# Patient Record
Sex: FEMALE | Race: WHITE | Employment: FULL TIME | URBAN - METROPOLITAN AREA
[De-identification: names, ages, dates, MRNs, and addresses within clinical notes are randomized per-mention and may not be internally consistent; named-entity substitution may affect disease eponyms.]

---

## 2018-11-08 LAB
AVERAGE GLUCOSE: 108
CHOLESTEROL, TOTAL: 195 MG/DL
CHOLESTEROL/HDL RATIO: 3.2
CREATININE: 1 MG/DL
HBA1C MFR BLD: 5.4 %
HDLC SERPL-MCNC: 61 MG/DL (ref 35–70)
LDL CHOLESTEROL CALCULATED: 105 MG/DL (ref 0–160)
POTASSIUM (K+): 4.3
TRIGL SERPL-MCNC: 145 MG/DL
VLDLC SERPL CALC-MCNC: NORMAL MG/DL

## 2018-12-14 ENCOUNTER — PREP FOR PROCEDURE (OUTPATIENT)
Dept: SURGERY | Age: 53
End: 2018-12-14

## 2018-12-14 RX ORDER — SODIUM CHLORIDE 0.9 % (FLUSH) 0.9 %
10 SYRINGE (ML) INJECTION PRN
Status: CANCELLED | OUTPATIENT
Start: 2018-12-14

## 2018-12-14 RX ORDER — SODIUM CHLORIDE 0.9 % (FLUSH) 0.9 %
10 SYRINGE (ML) INJECTION EVERY 12 HOURS SCHEDULED
Status: CANCELLED | OUTPATIENT
Start: 2018-12-14

## 2018-12-17 ENCOUNTER — ANESTHESIA EVENT (OUTPATIENT)
Dept: OPERATING ROOM | Age: 53
End: 2018-12-17
Payer: COMMERCIAL

## 2018-12-17 ENCOUNTER — ANESTHESIA (OUTPATIENT)
Dept: OPERATING ROOM | Age: 53
End: 2018-12-17
Payer: COMMERCIAL

## 2018-12-17 ENCOUNTER — HOSPITAL ENCOUNTER (OUTPATIENT)
Age: 53
Setting detail: OUTPATIENT SURGERY
Discharge: HOME | End: 2018-12-17
Attending: PLASTIC SURGERY | Admitting: PLASTIC SURGERY
Payer: COMMERCIAL

## 2018-12-17 VITALS
SYSTOLIC BLOOD PRESSURE: 144 MMHG | OXYGEN SATURATION: 99 % | DIASTOLIC BLOOD PRESSURE: 110 MMHG | RESPIRATION RATE: 1 BRPM

## 2018-12-17 VITALS
BODY MASS INDEX: 29.71 KG/M2 | SYSTOLIC BLOOD PRESSURE: 158 MMHG | OXYGEN SATURATION: 95 % | HEART RATE: 54 BPM | TEMPERATURE: 98 F | RESPIRATION RATE: 16 BRPM | HEIGHT: 64 IN | WEIGHT: 174 LBS | DIASTOLIC BLOOD PRESSURE: 70 MMHG

## 2018-12-17 DIAGNOSIS — M67.441 GANGLION CYST OF FINGER OF RIGHT HAND: Primary | ICD-10-CM

## 2018-12-17 LAB — HCG(URINE) PREGNANCY TEST: NEGATIVE

## 2018-12-17 PROCEDURE — 3600000012 HC SURGERY LEVEL 2 ADDTL 15MIN: Performed by: PLASTIC SURGERY

## 2018-12-17 PROCEDURE — 7100000000 HC PACU RECOVERY - FIRST 15 MIN: Performed by: PLASTIC SURGERY

## 2018-12-17 PROCEDURE — 81025 URINE PREGNANCY TEST: CPT

## 2018-12-17 PROCEDURE — 6360000002 HC RX W HCPCS: Performed by: NURSE ANESTHETIST, CERTIFIED REGISTERED

## 2018-12-17 PROCEDURE — 6360000002 HC RX W HCPCS: Performed by: PLASTIC SURGERY

## 2018-12-17 PROCEDURE — 2580000003 HC RX 258: Performed by: ANESTHESIOLOGY

## 2018-12-17 PROCEDURE — 7100000001 HC PACU RECOVERY - ADDTL 15 MIN: Performed by: PLASTIC SURGERY

## 2018-12-17 PROCEDURE — 88304 TISSUE EXAM BY PATHOLOGIST: CPT

## 2018-12-17 PROCEDURE — 3700000000 HC ANESTHESIA ATTENDED CARE: Performed by: PLASTIC SURGERY

## 2018-12-17 PROCEDURE — 6360000002 HC RX W HCPCS

## 2018-12-17 PROCEDURE — 7100000010 HC PHASE II RECOVERY - FIRST 15 MIN: Performed by: PLASTIC SURGERY

## 2018-12-17 PROCEDURE — 2709999900 HC NON-CHARGEABLE SUPPLY: Performed by: PLASTIC SURGERY

## 2018-12-17 PROCEDURE — 6370000000 HC RX 637 (ALT 250 FOR IP): Performed by: PLASTIC SURGERY

## 2018-12-17 PROCEDURE — 7100000011 HC PHASE II RECOVERY - ADDTL 15 MIN: Performed by: PLASTIC SURGERY

## 2018-12-17 PROCEDURE — 3700000001 HC ADD 15 MINUTES (ANESTHESIA): Performed by: PLASTIC SURGERY

## 2018-12-17 PROCEDURE — 3600000002 HC SURGERY LEVEL 2 BASE: Performed by: PLASTIC SURGERY

## 2018-12-17 RX ORDER — DEXAMETHASONE SODIUM PHOSPHATE 10 MG/ML
INJECTION, SOLUTION INTRAMUSCULAR; INTRAVENOUS PRN
Status: DISCONTINUED | OUTPATIENT
Start: 2018-12-17 | End: 2018-12-17 | Stop reason: SDUPTHER

## 2018-12-17 RX ORDER — ACETAMINOPHEN 325 MG/1
650 TABLET ORAL EVERY 4 HOURS PRN
Status: DISCONTINUED | OUTPATIENT
Start: 2018-12-17 | End: 2018-12-17 | Stop reason: HOSPADM

## 2018-12-17 RX ORDER — SODIUM CHLORIDE, SODIUM LACTATE, POTASSIUM CHLORIDE, CALCIUM CHLORIDE 600; 310; 30; 20 MG/100ML; MG/100ML; MG/100ML; MG/100ML
INJECTION, SOLUTION INTRAVENOUS CONTINUOUS
Status: DISCONTINUED | OUTPATIENT
Start: 2018-12-17 | End: 2018-12-17 | Stop reason: HOSPADM

## 2018-12-17 RX ORDER — OXYCODONE HYDROCHLORIDE AND ACETAMINOPHEN 5; 325 MG/1; MG/1
1 TABLET ORAL EVERY 4 HOURS PRN
Status: DISCONTINUED | OUTPATIENT
Start: 2018-12-17 | End: 2018-12-17 | Stop reason: HOSPADM

## 2018-12-17 RX ORDER — SODIUM CHLORIDE 0.9 % (FLUSH) 0.9 %
10 SYRINGE (ML) INJECTION PRN
Status: DISCONTINUED | OUTPATIENT
Start: 2018-12-17 | End: 2018-12-17 | Stop reason: HOSPADM

## 2018-12-17 RX ORDER — SODIUM CHLORIDE 0.9 % (FLUSH) 0.9 %
10 SYRINGE (ML) INJECTION EVERY 12 HOURS SCHEDULED
Status: DISCONTINUED | OUTPATIENT
Start: 2018-12-17 | End: 2018-12-17 | Stop reason: HOSPADM

## 2018-12-17 RX ORDER — DOCUSATE SODIUM 100 MG/1
100 CAPSULE, LIQUID FILLED ORAL 2 TIMES DAILY
Status: DISCONTINUED | OUTPATIENT
Start: 2018-12-17 | End: 2018-12-17 | Stop reason: HOSPADM

## 2018-12-17 RX ORDER — MORPHINE SULFATE 4 MG/ML
4 INJECTION, SOLUTION INTRAMUSCULAR; INTRAVENOUS
Status: DISCONTINUED | OUTPATIENT
Start: 2018-12-17 | End: 2018-12-17 | Stop reason: HOSPADM

## 2018-12-17 RX ORDER — OXYCODONE HYDROCHLORIDE AND ACETAMINOPHEN 5; 325 MG/1; MG/1
2 TABLET ORAL EVERY 4 HOURS PRN
Status: DISCONTINUED | OUTPATIENT
Start: 2018-12-17 | End: 2018-12-17 | Stop reason: HOSPADM

## 2018-12-17 RX ORDER — KETOROLAC TROMETHAMINE 30 MG/ML
30 INJECTION, SOLUTION INTRAMUSCULAR; INTRAVENOUS EVERY 6 HOURS
Status: DISCONTINUED | OUTPATIENT
Start: 2018-12-17 | End: 2018-12-17 | Stop reason: HOSPADM

## 2018-12-17 RX ORDER — ONDANSETRON 2 MG/ML
4 INJECTION INTRAMUSCULAR; INTRAVENOUS EVERY 6 HOURS PRN
Status: DISCONTINUED | OUTPATIENT
Start: 2018-12-17 | End: 2018-12-17 | Stop reason: HOSPADM

## 2018-12-17 RX ORDER — KETOROLAC TROMETHAMINE 30 MG/ML
INJECTION, SOLUTION INTRAMUSCULAR; INTRAVENOUS PRN
Status: DISCONTINUED | OUTPATIENT
Start: 2018-12-17 | End: 2018-12-17 | Stop reason: SDUPTHER

## 2018-12-17 RX ORDER — CEFAZOLIN SODIUM 2 G/50ML
2 SOLUTION INTRAVENOUS
Status: COMPLETED | OUTPATIENT
Start: 2018-12-17 | End: 2018-12-17

## 2018-12-17 RX ORDER — ACETAMINOPHEN AND CODEINE PHOSPHATE 300; 30 MG/1; MG/1
1-2 TABLET ORAL
Qty: 30 TABLET | Refills: 0 | Status: SHIPPED | OUTPATIENT
Start: 2018-12-17 | End: 2018-12-24

## 2018-12-17 RX ORDER — MORPHINE SULFATE 2 MG/ML
2 INJECTION, SOLUTION INTRAMUSCULAR; INTRAVENOUS
Status: DISCONTINUED | OUTPATIENT
Start: 2018-12-17 | End: 2018-12-17 | Stop reason: HOSPADM

## 2018-12-17 RX ORDER — ONDANSETRON 2 MG/ML
INJECTION INTRAMUSCULAR; INTRAVENOUS PRN
Status: DISCONTINUED | OUTPATIENT
Start: 2018-12-17 | End: 2018-12-17 | Stop reason: SDUPTHER

## 2018-12-17 RX ORDER — MIDAZOLAM HYDROCHLORIDE 1 MG/ML
INJECTION INTRAMUSCULAR; INTRAVENOUS PRN
Status: DISCONTINUED | OUTPATIENT
Start: 2018-12-17 | End: 2018-12-17 | Stop reason: SDUPTHER

## 2018-12-17 RX ORDER — KETOROLAC TROMETHAMINE 30 MG/ML
INJECTION, SOLUTION INTRAMUSCULAR; INTRAVENOUS
Status: COMPLETED
Start: 2018-12-17 | End: 2018-12-17

## 2018-12-17 RX ORDER — FENTANYL CITRATE 50 UG/ML
INJECTION, SOLUTION INTRAMUSCULAR; INTRAVENOUS PRN
Status: DISCONTINUED | OUTPATIENT
Start: 2018-12-17 | End: 2018-12-17 | Stop reason: SDUPTHER

## 2018-12-17 RX ADMIN — ONDANSETRON HYDROCHLORIDE 4 MG: 2 INJECTION, SOLUTION INTRAMUSCULAR; INTRAVENOUS at 08:31

## 2018-12-17 RX ADMIN — KETOROLAC TROMETHAMINE 30 MG: 30 INJECTION, SOLUTION INTRAMUSCULAR; INTRAVENOUS at 08:31

## 2018-12-17 RX ADMIN — MIDAZOLAM 2 MG: 1 INJECTION INTRAMUSCULAR; INTRAVENOUS at 07:45

## 2018-12-17 RX ADMIN — OXYCODONE HYDROCHLORIDE AND ACETAMINOPHEN 1 TABLET: 5; 325 TABLET ORAL at 10:45

## 2018-12-17 RX ADMIN — KETOROLAC TROMETHAMINE 30 MG: 30 INJECTION, SOLUTION INTRAMUSCULAR; INTRAVENOUS at 09:32

## 2018-12-17 RX ADMIN — SODIUM CHLORIDE, POTASSIUM CHLORIDE, SODIUM LACTATE AND CALCIUM CHLORIDE: 600; 310; 30; 20 INJECTION, SOLUTION INTRAVENOUS at 07:45

## 2018-12-17 RX ADMIN — SODIUM CHLORIDE, POTASSIUM CHLORIDE, SODIUM LACTATE AND CALCIUM CHLORIDE: 600; 310; 30; 20 INJECTION, SOLUTION INTRAVENOUS at 06:36

## 2018-12-17 RX ADMIN — CEFAZOLIN SODIUM 2 G: 2 SOLUTION INTRAVENOUS at 08:00

## 2018-12-17 RX ADMIN — FENTANYL CITRATE 100 MCG: 50 INJECTION, SOLUTION INTRAMUSCULAR; INTRAVENOUS at 07:45

## 2018-12-17 RX ADMIN — KETOROLAC TROMETHAMINE 30 MG: 30 INJECTION, SOLUTION INTRAMUSCULAR at 09:32

## 2018-12-17 RX ADMIN — DEXAMETHASONE SODIUM PHOSPHATE 10 MG: 10 INJECTION, SOLUTION INTRAMUSCULAR; INTRAVENOUS at 08:31

## 2018-12-17 ASSESSMENT — PULMONARY FUNCTION TESTS
PIF_VALUE: 2
PIF_VALUE: 4
PIF_VALUE: 2
PIF_VALUE: 1
PIF_VALUE: 3
PIF_VALUE: 2
PIF_VALUE: 3
PIF_VALUE: 1
PIF_VALUE: 3
PIF_VALUE: 3
PIF_VALUE: 1
PIF_VALUE: 1
PIF_VALUE: 3
PIF_VALUE: 1
PIF_VALUE: 0
PIF_VALUE: 0
PIF_VALUE: 3
PIF_VALUE: 0
PIF_VALUE: 2
PIF_VALUE: 2
PIF_VALUE: 0
PIF_VALUE: 2
PIF_VALUE: 3
PIF_VALUE: 1
PIF_VALUE: 2
PIF_VALUE: 3
PIF_VALUE: 2
PIF_VALUE: 1
PIF_VALUE: 2
PIF_VALUE: 3
PIF_VALUE: 4
PIF_VALUE: 2
PIF_VALUE: 2
PIF_VALUE: 1
PIF_VALUE: 3
PIF_VALUE: 3
PIF_VALUE: 2
PIF_VALUE: 3
PIF_VALUE: 2
PIF_VALUE: 3
PIF_VALUE: 3
PIF_VALUE: 0
PIF_VALUE: 4
PIF_VALUE: 2
PIF_VALUE: 3
PIF_VALUE: 3
PIF_VALUE: 2
PIF_VALUE: 3
PIF_VALUE: 3
PIF_VALUE: 1
PIF_VALUE: 2
PIF_VALUE: 0
PIF_VALUE: 0
PIF_VALUE: 2

## 2018-12-17 ASSESSMENT — PAIN DESCRIPTION - PAIN TYPE
TYPE: SURGICAL PAIN

## 2018-12-17 ASSESSMENT — PAIN DESCRIPTION - DESCRIPTORS
DESCRIPTORS: ACHING;BURNING
DESCRIPTORS: THROBBING
DESCRIPTORS: ACHING

## 2018-12-17 ASSESSMENT — PAIN DESCRIPTION - PROGRESSION: CLINICAL_PROGRESSION: GRADUALLY WORSENING

## 2018-12-17 ASSESSMENT — PAIN SCALES - GENERAL
PAINLEVEL_OUTOF10: 0
PAINLEVEL_OUTOF10: 4
PAINLEVEL_OUTOF10: 0
PAINLEVEL_OUTOF10: 0
PAINLEVEL_OUTOF10: 4
PAINLEVEL_OUTOF10: 0
PAINLEVEL_OUTOF10: 4
PAINLEVEL_OUTOF10: 5

## 2018-12-17 ASSESSMENT — PAIN DESCRIPTION - ORIENTATION
ORIENTATION: RIGHT

## 2018-12-17 ASSESSMENT — PAIN - FUNCTIONAL ASSESSMENT: PAIN_FUNCTIONAL_ASSESSMENT: 0-10

## 2018-12-17 ASSESSMENT — LIFESTYLE VARIABLES: SMOKING_STATUS: 0

## 2018-12-17 ASSESSMENT — PAIN DESCRIPTION - LOCATION
LOCATION: FINGER (COMMENT WHICH ONE)

## 2018-12-17 NOTE — PROGRESS NOTES
CLINICAL PHARMACY NOTE: MEDS TO [address] Select Patient?: No  Total # of Prescriptions Filled: 1   The following medications were delivered to the patient:  Acetaminophen/codeine #3  Total # of Interventions Completed: 3  Time Spent (min): 15    Additional Documentation:

## 2018-12-17 NOTE — ANESTHESIA PRE PROCEDURE
Endo/Other: Negative Endo/Other ROS                    Abdominal:         (-) obese     Vascular: negative vascular ROS. Anesthesia Plan      MAC     ASA 2       Induction: intravenous. MIPS: Postoperative opioids intended and Prophylactic antiemetics administered. Anesthetic plan and risks discussed with patient and spouse. Plan discussed with CRNA. DOS STAFF ADDENDUM:    Pt seen and examined, chart reviewed (including anesthesia, drug and allergy history). Anesthetic plan, risks, benefits, alternatives, and personnel involved discussed with patient. Patient verbalized an understanding and agrees to proceed. Plan discussed with care team members and agreed upon.     Kevin De Los Santos MD  Staff Anesthesiologist  6:49 AM      Kevin De Los Santos MD   12/17/2018

## 2018-12-18 NOTE — OP NOTE
excised. Hemostasis was secured. The site of communication into the joint was  scraped using a curette. The wound was thoroughly irrigated with  saline. The incision was closed with interrupted horizontal mattress  sutures of 5-0 nylon. Dressing consisted of Xeroform gauze, 2 x 2  gauze, and Sanjana. The patient tolerated the procedure well and was  transferred to the recovery room in a stable condition.         Humberto Bell MD    D: 12/17/2018 18:27:03       T: 12/18/2018 1:58:46     [email or web address]  Job#: [id number]     Doc#: [id number]  CC:

## 2020-05-28 VITALS
RESPIRATION RATE: 14 BRPM | WEIGHT: 174 LBS | BODY MASS INDEX: 29.71 KG/M2 | HEART RATE: 58 BPM | SYSTOLIC BLOOD PRESSURE: 120 MMHG | DIASTOLIC BLOOD PRESSURE: 80 MMHG | HEIGHT: 64 IN

## 2020-09-11 VITALS
TEMPERATURE: 96.2 F | RESPIRATION RATE: 14 BRPM | HEIGHT: 64 IN | DIASTOLIC BLOOD PRESSURE: 80 MMHG | WEIGHT: 189 LBS | SYSTOLIC BLOOD PRESSURE: 134 MMHG | HEART RATE: 62 BPM | BODY MASS INDEX: 32.27 KG/M2

## 2020-10-02 LAB
AVERAGE GLUCOSE: 108
CHOLESTEROL, TOTAL: 218 MG/DL
CHOLESTEROL/HDL RATIO: 4
CREATININE: 0.8 MG/DL
HBA1C MFR BLD: 5.4 %
HDLC SERPL-MCNC: 62 MG/DL (ref 35–70)
LDL CHOLESTEROL CALCULATED: 123 MG/DL (ref 0–160)
NONHDLC SERPL-MCNC: NORMAL MG/DL
POTASSIUM (K+): 4.4
TRIGL SERPL-MCNC: 165 MG/DL
VLDLC SERPL CALC-MCNC: NORMAL MG/DL

## 2021-03-02 ENCOUNTER — HOSPITAL ENCOUNTER (EMERGENCY)
Age: 56
Discharge: HOME | End: 2021-03-02
Payer: COMMERCIAL

## 2021-03-02 VITALS
OXYGEN SATURATION: 95 % | DIASTOLIC BLOOD PRESSURE: 83 MMHG | HEIGHT: 64 IN | TEMPERATURE: 97.8 F | WEIGHT: 175 LBS | RESPIRATION RATE: 18 BRPM | SYSTOLIC BLOOD PRESSURE: 135 MMHG | HEART RATE: 57 BPM | BODY MASS INDEX: 29.88 KG/M2

## 2021-03-02 DIAGNOSIS — L03.011 PARONYCHIA OF RIGHT INDEX FINGER: Primary | ICD-10-CM

## 2021-03-02 PROCEDURE — 99211 OFF/OP EST MAY X REQ PHY/QHP: CPT

## 2021-03-02 RX ORDER — SULFAMETHOXAZOLE AND TRIMETHOPRIM 800; 160 MG/1; MG/1
1 TABLET ORAL 2 TIMES DAILY
Qty: 10 TABLET | Refills: 0 | Status: SHIPPED | OUTPATIENT
Start: 2021-03-02 | End: 2021-03-07

## 2021-03-02 ASSESSMENT — PAIN DESCRIPTION - LOCATION: LOCATION: FINGER (COMMENT WHICH ONE)

## 2021-03-02 ASSESSMENT — PAIN DESCRIPTION - ORIENTATION: ORIENTATION: RIGHT

## 2021-03-02 ASSESSMENT — PAIN DESCRIPTION - PAIN TYPE: TYPE: ACUTE PAIN

## 2021-03-02 NOTE — ED PROVIDER NOTES
[address] [facility]  Department of Emergency Medicine   ED  Encounter Note  Admit Date/RoomTime: 3/2/2021  4:55 PM  ED Room:     NAME: Khalif Kendrick  : 1965  MRN: [id number]     Chief Complaint:  Paronychia (Pt c/o redness and throbbing pain around R index finger for 3 days)    History of Present Illness        Khalif Kendrick is a 48 y.o. old female presenting to the emergency department with an infection to her right finger and fingernail. Patient states for 3 days now she has had a throbbing pain to the tip of her right index finger. She is also red. She does admit to some picking around the nail and what she thought was a little cut in the area. She does admit she had some bleeding along the side of her nail. She is tried no over-the-counter medications for this complaint. Patient is right-handed. ROS   Pertinent positives and negatives are stated within HPI, all other systems reviewed and are negative. Past Medical History:  has a past medical history of Celiac disease and Hyperlipidemia. Surgical History:  has a past surgical history that includes  section; Endoscopy, colon, diagnostic; EXCISION LESION HAND / FINGER (Right, 2018); and Tubal ligation. Social History:  reports that she quit smoking about 18 years ago. Her smoking use included cigarettes. She has a 18.00 pack-year smoking history. She has never used smokeless tobacco. She reports current alcohol use of about 1.0 standard drinks of alcohol per week. She reports that she does not use drugs. Family History: family history includes Coronary Art Dis in her brother; Heart Attack (age of onset: 68) in her father. Allergies: Patient has no known allergies.     Physical Exam   Oxygen Saturation Interpretation: Normal.        ED Triage Vitals [21 1655]   BP Temp Temp Source Pulse Resp SpO2 Height Weight   135/83 97.8 °F (36.6 °C) Infrared 57 18 95 % 5' 4\" (1.626 m) 175 lb (79.4 kg) General:  NAD. Alert and Oriented. Well-appearing. Skin:  Warm, dry. No rashes. Head:  Normocephalic. Atraumatic. Eyes:  EOMI. Conjunctiva normal.  ENT:  Oral mucosa moist.  Airway patent. Neck:  Supple. Normal ROM. Respiratory:  No respiratory distress. No labored breathing. Lungs clear without rales, rhonchi or wheezing. Cardiovascular:  Regular rate. No Murmur. No peripheral edema. Extremities warm and good color. Extremities: Redness and swelling around the nail to the right index finger. There is no pus pocket. There is some dried bloody crusting to the lateral nail margin. 2+ right radial pulse. Back:  Normal ROM. Nontender to palpation. Neuro:  Alert and Oriented to person, place, time and situation. Normal LOC. Moves all extremities. Speech fluent. Psych:  Calm and Cooperative. Normal thought process. Normal judgement. Lab / Imaging Results   (All laboratory and radiology results have been personally reviewed by myself)  Labs:  No results found for this visit on 03/02/21. Imaging: All Radiology results interpreted by Radiologist unless otherwise noted. No orders to display       ED Course / Medical Decision Making   Medications - No data to display     Consult(s):   None    Procedure(s):   none    MDM:      Imaging was not obtained based on no suspicion for fracture / bony abnormality as per history/physical findings. Plan is subsequently for symptom control, limited use and  immobilization with appropriate outpatient follow-up. Plan of Care/Counseling:  I reviewed today's visit with the patient in addition to providing specific details for the plan of care and counseling regarding the diagnosis and prognosis. Questions are answered at this time and are agreeable with the plan. Assessment      1. Paronychia of right index finger New Problem     Plan   Discharge home.   Patient condition is good    New Medications     New Prescriptions

## 2021-09-24 ENCOUNTER — TELEPHONE (OUTPATIENT)
Dept: FAMILY MEDICINE CLINIC | Age: 56
End: 2021-09-24

## 2021-09-29 ENCOUNTER — OFFICE VISIT (OUTPATIENT)
Dept: FAMILY MEDICINE CLINIC | Age: 56
End: 2021-09-29
Payer: COMMERCIAL

## 2021-09-29 VITALS
BODY MASS INDEX: 30.04 KG/M2 | DIASTOLIC BLOOD PRESSURE: 86 MMHG | WEIGHT: 175 LBS | TEMPERATURE: 97.5 F | OXYGEN SATURATION: 96 % | HEART RATE: 52 BPM | SYSTOLIC BLOOD PRESSURE: 132 MMHG

## 2021-09-29 DIAGNOSIS — E78.5 HYPERLIPIDEMIA, UNSPECIFIED HYPERLIPIDEMIA TYPE: ICD-10-CM

## 2021-09-29 DIAGNOSIS — K90.0 CELIAC DISEASE: ICD-10-CM

## 2021-09-29 DIAGNOSIS — R01.1 SYSTOLIC MURMUR: ICD-10-CM

## 2021-09-29 DIAGNOSIS — F32.A DEPRESSION, UNSPECIFIED DEPRESSION TYPE: ICD-10-CM

## 2021-09-29 DIAGNOSIS — K21.9 GASTROESOPHAGEAL REFLUX DISEASE WITHOUT ESOPHAGITIS: ICD-10-CM

## 2021-09-29 DIAGNOSIS — R06.83 SNORING: ICD-10-CM

## 2021-09-29 DIAGNOSIS — J34.89 PAIN OF MAXILLARY SINUS: Primary | ICD-10-CM

## 2021-09-29 PROCEDURE — 99214 OFFICE O/P EST MOD 30 MIN: CPT | Performed by: INTERNAL MEDICINE

## 2021-09-29 PROCEDURE — G8417 CALC BMI ABV UP PARAM F/U: HCPCS | Performed by: INTERNAL MEDICINE

## 2021-09-29 PROCEDURE — 3017F COLORECTAL CA SCREEN DOC REV: CPT | Performed by: INTERNAL MEDICINE

## 2021-09-29 PROCEDURE — G8427 DOCREV CUR MEDS BY ELIG CLIN: HCPCS | Performed by: INTERNAL MEDICINE

## 2021-09-29 PROCEDURE — 1036F TOBACCO NON-USER: CPT | Performed by: INTERNAL MEDICINE

## 2021-09-29 SDOH — ECONOMIC STABILITY: FOOD INSECURITY: WITHIN THE PAST 12 MONTHS, THE FOOD YOU BOUGHT JUST DIDN'T LAST AND YOU DIDN'T HAVE MONEY TO GET MORE.: NEVER TRUE

## 2021-09-29 SDOH — ECONOMIC STABILITY: FOOD INSECURITY: WITHIN THE PAST 12 MONTHS, YOU WORRIED THAT YOUR FOOD WOULD RUN OUT BEFORE YOU GOT MONEY TO BUY MORE.: NEVER TRUE

## 2021-09-29 ASSESSMENT — PATIENT HEALTH QUESTIONNAIRE - PHQ9
1. LITTLE INTEREST OR PLEASURE IN DOING THINGS: 0
SUM OF ALL RESPONSES TO PHQ QUESTIONS 1-9: 0
SUM OF ALL RESPONSES TO PHQ QUESTIONS 1-9: 0
2. FEELING DOWN, DEPRESSED OR HOPELESS: 0
SUM OF ALL RESPONSES TO PHQ9 QUESTIONS 1 & 2: 0
SUM OF ALL RESPONSES TO PHQ QUESTIONS 1-9: 0

## 2021-09-29 ASSESSMENT — SOCIAL DETERMINANTS OF HEALTH (SDOH): HOW HARD IS IT FOR YOU TO PAY FOR THE VERY BASICS LIKE FOOD, HOUSING, MEDICAL CARE, AND HEATING?: NOT HARD AT ALL

## 2021-09-29 NOTE — PROGRESS NOTES
Subjective:     Chief Complaint   Patient presents with    Facial Swelling   Patient complains of swelling on the right maxillary area for the past 1 year, which is progressively getting worse, she has noticed in her pictures and when she smiles her right side of the nose is elevated than the left side, she has noticed prominence right middle area, she has been snoring more than before,  Denies any difficulty breathing,  Denies any runny nose or stuffy nose,    She was seen by Dr. Zoltan Haney in 2020 for some throat blood work which was okay    Has history of depression seen by Dr. Collin Cardoza who started her on Celexa during menopause    She has celiac disease doing better with gluten-free diet she follows with Dr. Calista Lynn    History of systolic murmur seen by cardiologist Dr. Bronwyn Monique work-up negative    Past Medical History:   Diagnosis Date    Celiac disease     Hyperlipidemia         Social History     Socioeconomic History    Marital status:      Spouse name: Not on file    Number of children: Not on file    Years of education: Not on file    Highest education level: Not on file   Occupational History    Not on file   Tobacco Use    Smoking status: Former Smoker     Packs/day: 1.00     Years: 18.00     Pack years: 18.00     Types: Cigarettes     Quit date: 2003     Years since quittin.5    Smokeless tobacco: Never Used   Vaping Use    Vaping Use: Never used   Substance and Sexual Activity    Alcohol use:  Yes     Alcohol/week: 1.0 standard drinks     Types: 1 Shots of liquor per week     Comment: rarely    Drug use: No    Sexual activity: Not on file   Other Topics Concern    Not on file   Social History Narrative    Not on file     Social Determinants of Health     Financial Resource Strain: Low Risk     Difficulty of Paying Living Expenses: Not hard at all   Food Insecurity: No Food Insecurity    Worried About [address] in the Last Year: Never true   [employer] of [employer] in the Last Year: Never true   Transportation Needs:     Lack of Transportation (Medical):  Lack of Transportation (Non-Medical):    Physical Activity:     Days of Exercise per Week:     Minutes of Exercise per Session:    Stress:     Feeling of Stress :    Social Connections:     Frequency of Communication with Friends and Family:     Frequency of Social Gatherings with Friends and Family:     Attends Caodaism Services:     Active Member of Clubs or Organizations:     Attends Club or Organization Meetings:     Marital Status:    Intimate Partner Violence:     Fear of Current or Ex-Partner:     Emotionally Abused:     Physically Abused:     Sexually Abused:         Past Surgical History:   Procedure Laterality Date     SECTION      twice    ENDOSCOPY, COLON, DIAGNOSTIC      EXCISION LESION HAND / FINGER Right 2018    EXCISION GANGLION CYST RIGHT INDEX FINGER performed by Eusebio Sylvester [facility] at [facility]          Family History   Problem Relation Age of Onset    Heart Attack Father 68    Coronary Art Dis Brother         recently  of CAD         No Known Allergies     ROS  No acute distress  Cardiac: Denies any chest pain or palpitation  Physically very active denies any angina or dyspnea  History of soft systolic murmur seen by Dr. Gilbert Jones had echo work-up negative in 2018  Respiratory: Denies any cough or shortness of breath  GI: No abdominal pain.  Denies any nausea vomiting or diarrhea  History of celiac disease stable on gluten-free diet  Colonoscopy 2020 with Dr. Simon Yao she will get me a copy she follows with him twice a year  : Denies any dysuria frequency or hematuria  Neuro: No headache or dizziness  Endocrine: No diabetes  Skin: normal  No recent weight gain or weight loss  Denies any change in vision    Her snoring is worse than before  Feels a fullness and prominence on the right maxillary area  Is a smiling the right side of the nose goes up    Objective:    /86   Pulse 52   Temp 97.5 °F (36.4 °C)   Wt 175 lb (79.4 kg)   LMP 07/10/2018   SpO2 96%   BMI 30.04 kg/m²     Constitutional: Alert awake and oriented    There is prominence of the right maxillary area  There is swelling of the right maxillary area  No maxillary tenderness    Eyes: Pupils equal bilaterally. Extraocular muscles intact  Neck: no JVD adenopathy no bruit  Neck no adenopathy  Heart:  RRR, soft 1/6 systolic murmur, no gallops, or rubs. Lungs:    no wheeze, rales or rhonchi  Abd: bowel sounds present, nontender, nondistended, no masses  Extrem:  No clubbing, cyanosis, or edema  Neuro: AAOx3,No Focal deficit  Psychological: no depression or anxiety   Asymmetry of the face    Current Outpatient Medications   Medication Sig Dispense Refill    Omeprazole (PRILOSEC PO) Take by mouth daily      citalopram (CELEXA) 20 MG tablet Take 20 mg by mouth daily       No current facility-administered medications for this visit. Last 3 BMP  Lab Results   Component Value Date/Time    K 4.4 10/02/2020 12:00 AM    K 4.3 11/08/2018 12:00 AM    CREATININE 0.8 10/02/2020 12:00 AM    CREATININE 1.0 11/08/2018 12:00 AM       Last 3 CMP:    Lab Results   Component Value Date/Time    K 4.4 10/02/2020 12:00 AM    K 4.3 11/08/2018 12:00 AM    CREATININE 0.8 10/02/2020 12:00 AM    CREATININE 1.0 11/08/2018 12:00 AM        CBC:   No results found for: WBC, RBC, HGB, HCT, MCV, MCH, MCHC, RDW, PLT, MPV    A1C:  Lab Results   Component Value Date/Time    LABA1C 5.4 10/02/2020 12:00 AM       Lipid panel:  Lab Results   Component Value Date    CHOL 218 10/02/2020    CHOL 195 11/08/2018    TRIG 165 10/02/2020    TRIG 145 11/08/2018    HDL 62 10/02/2020    HDL 61 11/08/2018        No results found for: PROT, INR    No results found for: MG      Assessment. Napoleon Player was seen today for facial swelling.     Diagnoses and all orders for this visit:    Pain of maxillary sinus  -     CT SINUS WO CONTRAST; Future  -     External Referral To ENT    Snoring  -     CT SINUS WO CONTRAST; Future  -     External Referral To ENT    Gastroesophageal reflux disease without esophagitis    Depression, unspecified depression type    Celiac disease    Hyperlipidemia, unspecified hyperlipidemia type    Systolic murmur       Patient Active Problem List   Diagnosis    Celiac disease    Depression    Gastroesophageal reflux disease without esophagitis    Systolic murmur       Plan: Swelling right side of the face, prominence of the right maxillary area, CT sinus cyst rule out any tumor  Refer to ENT Dr. Red Guardado reflux disease stable on the Prilosec    Celiac disease doing better with the gluten-free diet    Hyperlipidemia check CMP and lipid profile    Asymptomatic soft systolic murmur evaluated by cardiologist 2018 echo was normal    Postmenopausal she follows with Dr. Shreya French    Depression controlled with Celexa 20 mg daily  Follow back in 4 to 6 weeks    Return in about 2 months (around 11/29/2021).        Hiram Sena MD  12:37 PM  9/29/2021     DE

## 2022-01-06 ENCOUNTER — TELEPHONE (OUTPATIENT)
Dept: FAMILY MEDICINE CLINIC | Age: 57
End: 2022-01-06

## 2022-01-06 NOTE — TELEPHONE ENCOUNTER
----- Message from Dorothey Net sent at 2022  3:26 PM EST -----  Subject: Appointment Request    Reason for Call: New Patient Request Appointment    QUESTIONS  Type of Appointment? New Patient/New to Provider  Reason for appointment request? No appointments available during search  Additional Information for Provider? Pt is needing a new provider. She is   a ex patient of Dr. Dakota Vargas. She is really interested in seeing   Elizabeth Hutchinson. I don't believe she is taking new patients. Her   daughter Cozette Cogan is a patient. Please advise.  ---------------------------------------------------------------------------  --------------  CALL BACK INFO  What is the best way for the office to contact you? OK to leave message on   voicemail  Preferred Call Back Phone Number? [phone]  ---------------------------------------------------------------------------  --------------  SCRIPT ANSWERS  Relationship to Patient? Self  Specialty Confirmation? Primary Care  Is this the first appointment to establish care for a ? No  Have you been diagnosed with, awaiting test results for, or told that you   are suspected of having COVID-19 (Coronavirus)? (If patient has tested   negative or was tested as a requirement for work, school, or travel and   not based on symptoms, answer no)? No  Within the past two weeks have you developed any of the following symptoms   (answer no if symptoms have been present longer than 2 weeks or began   more than 2 weeks ago)? Fever or Chills, Cough, Shortness of breath or   difficulty breathing, Loss of taste or smell, Sore throat, Nasal   congestion, Sneezing or runny nose, Fatigue or generalized body aches   (answer no if pain is specific to a body part e.g. back pain), Diarrhea,   Headache? No  Have you had close contact with someone with COVID-19 in the last 14 days? No  (Service Expert - click yes below to proceed with [employer] As Usual   Scheduling)?  Yes

## 2022-01-31 ENCOUNTER — OFFICE VISIT (OUTPATIENT)
Dept: FAMILY MEDICINE CLINIC | Age: 57
End: 2022-01-31
Payer: COMMERCIAL

## 2022-01-31 VITALS
BODY MASS INDEX: 30.56 KG/M2 | HEART RATE: 65 BPM | WEIGHT: 179 LBS | SYSTOLIC BLOOD PRESSURE: 132 MMHG | OXYGEN SATURATION: 98 % | RESPIRATION RATE: 20 BRPM | HEIGHT: 64 IN | DIASTOLIC BLOOD PRESSURE: 84 MMHG

## 2022-01-31 DIAGNOSIS — F32.9 REACTIVE DEPRESSION: Primary | ICD-10-CM

## 2022-01-31 DIAGNOSIS — Z13.220 SCREENING CHOLESTEROL LEVEL: ICD-10-CM

## 2022-01-31 DIAGNOSIS — K21.9 GASTROESOPHAGEAL REFLUX DISEASE WITHOUT ESOPHAGITIS: ICD-10-CM

## 2022-01-31 PROCEDURE — G8427 DOCREV CUR MEDS BY ELIG CLIN: HCPCS | Performed by: FAMILY MEDICINE

## 2022-01-31 PROCEDURE — G8417 CALC BMI ABV UP PARAM F/U: HCPCS | Performed by: FAMILY MEDICINE

## 2022-01-31 PROCEDURE — G8484 FLU IMMUNIZE NO ADMIN: HCPCS | Performed by: FAMILY MEDICINE

## 2022-01-31 PROCEDURE — 99214 OFFICE O/P EST MOD 30 MIN: CPT | Performed by: FAMILY MEDICINE

## 2022-01-31 PROCEDURE — 1036F TOBACCO NON-USER: CPT | Performed by: FAMILY MEDICINE

## 2022-01-31 PROCEDURE — 3017F COLORECTAL CA SCREEN DOC REV: CPT | Performed by: FAMILY MEDICINE

## 2022-01-31 ASSESSMENT — ENCOUNTER SYMPTOMS
NAUSEA: 0
DIARRHEA: 0
VOMITING: 0
SHORTNESS OF BREATH: 0

## 2022-01-31 NOTE — PROGRESS NOTES
Patient is a new patient here to get established. Previous doctor: Dr. Oly Bowers Kellie(:  1965) is a 55 y.o. female, here for     Patient has had bilateral hand pain and stiffness in mornings. Patient denies need for pain medication. Uses exercise ball in mornings. Denies swelling. Started this winter. Right middle finger is worse due to old injury. Patient has history of GERD. Sees Dr. Talha Trujillo and taking omeprazole. Also has history of Celiac disease. Patient had history of PMDD. Has been taking Celexa daily for years. Wants to know how to safely wean off. Review of Systems   Eyes: Negative for visual disturbance. Respiratory: Negative for shortness of breath. Cardiovascular: Negative for chest pain, palpitations and leg swelling. Gastrointestinal: Negative for diarrhea, nausea and vomiting. Genitourinary: Negative for difficulty urinating, dysuria and frequency. Skin: Negative for rash. Psychiatric/Behavioral: Negative for dysphoric mood. Prior to Visit Medications    Medication Sig Taking?  Authorizing Provider   Omeprazole (PRILOSEC PO) Take by mouth daily Yes Historical Provider, MD   citalopram (CELEXA) 20 MG tablet Take 20 mg by mouth daily Yes Historical Provider, MD        No Known Allergies    Past Medical History:   Diagnosis Date    Celiac disease     GERD (gastroesophageal reflux disease)        Past Surgical History:   Procedure Laterality Date     SECTION      twice    ENDOSCOPY, COLON, DIAGNOSTIC      EXCISION LESION HAND / FINGER Right 2018    EXCISION GANGLION CYST RIGHT INDEX FINGER performed by Eduardo Corado [facility] at [facility] History     Socioeconomic History    Marital status:      Spouse name: Not on file    Number of children: Not on file    Years of education: Not on file    Highest education level: Not on file   Occupational History    Not on file   Tobacco Use    Smoking status: Former Smoker     Packs/day: 1.00     Years: 18.00     Pack years: 18.00     Types: Cigarettes     Quit date: 2003     Years since quittin.9    Smokeless tobacco: Never Used   Vaping Use    Vaping Use: Never used   Substance and Sexual Activity    Alcohol use: Yes     Alcohol/week: 1.0 standard drink     Types: 1 Shots of liquor per week     Comment: occasionally    Drug use: No    Sexual activity: Not on file   Other Topics Concern    Not on file   Social History Narrative    Not on file     Social Determinants of Health     Financial Resource Strain: Low Risk     Difficulty of Paying Living Expenses: Not hard at all   Food Insecurity: No Food Insecurity    Worried About [address] in the Last Year: Never true    [facility] [employer] in the Last Year: Never true   Transportation Needs:     Lack of Transportation (Medical): Not on file    Lack of Transportation (Non-Medical):  Not on file   Physical Activity:     Days of Exercise per Week: Not on file    Minutes of Exercise per Session: Not on file   Stress:     Feeling of Stress : Not on file   Social Connections:     Frequency of Communication with Friends and Family: Not on file    Frequency of Social Gatherings with Friends and Family: Not on file    Attends Hoahaoism Services: Not on file    Active Member of [address] [place] [address] or Organizations: Not on file    Attends Club or Organization Meetings: Not on file    Marital Status: Not on file   Intimate Partner Violence:     Fear of Current or Ex-Partner: Not on file    Emotionally Abused: Not on file    Physically Abused: Not on file    Sexually Abused: Not on file   Housing Stability:     Unable to Pay for Housing in the Last Year: Not on file    Number of [place] in the Last Year: Not on file    Unstable Housing in the Last Year: Not on file        Family History   Problem Relation Age of Onset    High Blood Pressure Mother     Heart Attack Father 68    Heart Disease Brother         recently  of CAD     Cancer Maternal Grandmother         stomach    Lung Cancer Maternal Grandfather     Heart Disease Paternal Grandfather        Vitals:    22 1459   BP: 132/84   Pulse: 65   Resp: 20   SpO2: 98%   Weight: 179 lb (81.2 kg)   Height: 5' 4\" (1.626 m)     Estimated body mass index is 30.73 kg/m² as calculated from the following:    Height as of this encounter: 5' 4\" (1.626 m). Weight as of this encounter: 179 lb (81.2 kg). Physical Exam  Vitals reviewed. Constitutional:       Appearance: She is well-developed. Cardiovascular:      Rate and Rhythm: Normal rate and regular rhythm. Heart sounds: Normal heart sounds. No murmur heard. No friction rub. Pulmonary:      Effort: Pulmonary effort is normal. No respiratory distress. Breath sounds: Normal breath sounds. No wheezing or rales. Abdominal:      General: Bowel sounds are normal. There is no distension. Palpations: Abdomen is soft. Tenderness: There is no abdominal tenderness. There is no guarding or rebound. Skin:     General: Skin is warm and dry. Neurological:      Mental Status: She is alert and oriented to person, place, and time. ASSESSMENT/PLAN:  Rupesh Salamanca was seen today for established new doctor. Diagnoses and all orders for this visit:    Reactive depression  -     CBC; Future  -     Comprehensive Metabolic Panel; Future  -     TSH without Reflex; Future        -     Patient in advised to take Celexa 20 mg every other day for the next 2 to 3 weeks to wean off medication safely; if symptoms do not worsen on the off days, she can safely discontinue after that. Gastroesophageal reflux disease without esophagitis        -     Omeprazole 20 mg daily for management    Screening cholesterol level  -     Lipid Panel; Future          Return if symptoms worsen or fail to improve.     Magui Salazar MD

## 2022-05-20 LAB
ALBUMIN SERPL-MCNC: NORMAL G/DL
ALP BLD-CCNC: NORMAL U/L
ALT SERPL-CCNC: NORMAL U/L
ANION GAP SERPL CALCULATED.3IONS-SCNC: NORMAL MMOL/L
AST SERPL-CCNC: NORMAL U/L
BASOPHILS ABSOLUTE: NORMAL
BASOPHILS RELATIVE PERCENT: NORMAL
BILIRUB SERPL-MCNC: NORMAL MG/DL
BUN BLDV-MCNC: NORMAL MG/DL
CALCIUM SERPL-MCNC: NORMAL MG/DL
CHLORIDE BLD-SCNC: NORMAL MMOL/L
CHOLESTEROL, TOTAL: 212 MG/DL
CHOLESTEROL/HDL RATIO: 4
CO2: NORMAL
CREAT SERPL-MCNC: NORMAL MG/DL
EOSINOPHILS ABSOLUTE: NORMAL
EOSINOPHILS RELATIVE PERCENT: NORMAL
GFR CALCULATED: NORMAL
GLUCOSE BLD-MCNC: NORMAL MG/DL
HCT VFR BLD CALC: NORMAL %
HDLC SERPL-MCNC: 59 MG/DL (ref 35–70)
HEMOGLOBIN: NORMAL
LDL CHOLESTEROL CALCULATED: 122 MG/DL (ref 0–160)
LYMPHOCYTES ABSOLUTE: NORMAL
LYMPHOCYTES RELATIVE PERCENT: NORMAL
MCH RBC QN AUTO: NORMAL PG
MCHC RBC AUTO-ENTMCNC: NORMAL G/DL
MCV RBC AUTO: NORMAL FL
MONOCYTES ABSOLUTE: NORMAL
MONOCYTES RELATIVE PERCENT: NORMAL
NEUTROPHILS ABSOLUTE: NORMAL
NEUTROPHILS RELATIVE PERCENT: NORMAL
NONHDLC SERPL-MCNC: NORMAL MG/DL
PLATELET # BLD: NORMAL 10*3/UL
PMV BLD AUTO: NORMAL FL
POTASSIUM SERPL-SCNC: NORMAL MMOL/L
RBC # BLD: NORMAL 10*6/UL
SODIUM BLD-SCNC: NORMAL MMOL/L
TOTAL PROTEIN: NORMAL
TRIGL SERPL-MCNC: 153 MG/DL
TSH SERPL DL<=0.05 MIU/L-ACNC: NORMAL M[IU]/L
VLDLC SERPL CALC-MCNC: NORMAL MG/DL
WBC # BLD: NORMAL 10*3/UL

## 2022-05-23 DIAGNOSIS — Z13.220 SCREENING CHOLESTEROL LEVEL: ICD-10-CM

## 2022-05-23 DIAGNOSIS — F32.9 REACTIVE DEPRESSION: ICD-10-CM

## 2022-07-08 ENCOUNTER — TELEPHONE (OUTPATIENT)
Dept: FAMILY MEDICINE CLINIC | Age: 57
End: 2022-07-08

## 2022-07-08 NOTE — TELEPHONE ENCOUNTER
I called patient and [facility] requesting patient call back if she would like Dr. Angeli Hendricks to place an order for a Mammogram.

## 2023-09-11 ASSESSMENT — PATIENT HEALTH QUESTIONNAIRE - PHQ9
9. THOUGHTS THAT YOU WOULD BE BETTER OFF DEAD, OR OF HURTING YOURSELF: NOT AT ALL
SUM OF ALL RESPONSES TO PHQ QUESTIONS 1-9: 2
2. FEELING DOWN, DEPRESSED OR HOPELESS: NOT AT ALL
4. FEELING TIRED OR HAVING LITTLE ENERGY: 0
1. LITTLE INTEREST OR PLEASURE IN DOING THINGS: NOT AT ALL
SUM OF ALL RESPONSES TO PHQ QUESTIONS 1-9: 2
3. TROUBLE FALLING OR STAYING ASLEEP: 0
4. FEELING TIRED OR HAVING LITTLE ENERGY: NOT AT ALL
8. MOVING OR SPEAKING SO SLOWLY THAT OTHER PEOPLE COULD HAVE NOTICED. OR THE OPPOSITE, BEING SO FIGETY OR RESTLESS THAT YOU HAVE BEEN MOVING AROUND A LOT MORE THAN USUAL: 0
7. TROUBLE CONCENTRATING ON THINGS, SUCH AS READING THE NEWSPAPER OR WATCHING TELEVISION: 0
SUM OF ALL RESPONSES TO PHQ QUESTIONS 1-9: 2
6. FEELING BAD ABOUT YOURSELF - OR THAT YOU ARE A FAILURE OR HAVE LET YOURSELF OR YOUR FAMILY DOWN: NOT AT ALL
8. MOVING OR SPEAKING SO SLOWLY THAT OTHER PEOPLE COULD HAVE NOTICED. OR THE OPPOSITE - BEING SO FIDGETY OR RESTLESS THAT YOU HAVE BEEN MOVING AROUND A LOT MORE THAN USUAL: NOT AT ALL
2. FEELING DOWN, DEPRESSED OR HOPELESS: 0
1. LITTLE INTEREST OR PLEASURE IN DOING THINGS: 0
10. IF YOU CHECKED OFF ANY PROBLEMS, HOW DIFFICULT HAVE THESE PROBLEMS MADE IT FOR YOU TO DO YOUR WORK, TAKE CARE OF THINGS AT HOME, OR GET ALONG WITH OTHER PEOPLE: NOT DIFFICULT AT ALL
SUM OF ALL RESPONSES TO PHQ9 QUESTIONS 1 & 2: 0
7. TROUBLE CONCENTRATING ON THINGS, SUCH AS READING THE NEWSPAPER OR WATCHING TELEVISION: NOT AT ALL
SUM OF ALL RESPONSES TO PHQ QUESTIONS 1-9: 2
3. TROUBLE FALLING OR STAYING ASLEEP: NOT AT ALL
9. THOUGHTS THAT YOU WOULD BE BETTER OFF DEAD, OR OF HURTING YOURSELF: 0
5. POOR APPETITE OR OVEREATING: 2
6. FEELING BAD ABOUT YOURSELF - OR THAT YOU ARE A FAILURE OR HAVE LET YOURSELF OR YOUR FAMILY DOWN: 0
10. IF YOU CHECKED OFF ANY PROBLEMS, HOW DIFFICULT HAVE THESE PROBLEMS MADE IT FOR YOU TO DO YOUR WORK, TAKE CARE OF THINGS AT HOME, OR GET ALONG WITH OTHER PEOPLE: 0
SUM OF ALL RESPONSES TO PHQ QUESTIONS 1-9: 2
5. POOR APPETITE OR OVEREATING: MORE THAN HALF THE DAYS

## 2023-09-14 ENCOUNTER — OFFICE VISIT (OUTPATIENT)
Dept: FAMILY MEDICINE CLINIC | Age: 58
End: 2023-09-14

## 2023-09-14 VITALS
SYSTOLIC BLOOD PRESSURE: 136 MMHG | HEIGHT: 64 IN | OXYGEN SATURATION: 98 % | RESPIRATION RATE: 20 BRPM | HEART RATE: 63 BPM | BODY MASS INDEX: 31.07 KG/M2 | DIASTOLIC BLOOD PRESSURE: 88 MMHG | WEIGHT: 182 LBS

## 2023-09-14 DIAGNOSIS — Z00.00 WELLNESS EXAMINATION: Primary | ICD-10-CM

## 2023-09-14 DIAGNOSIS — E78.2 MIXED HYPERLIPIDEMIA: ICD-10-CM

## 2023-09-14 DIAGNOSIS — R63.5 ABNORMAL WEIGHT GAIN: ICD-10-CM

## 2023-09-14 RX ORDER — PHENTERMINE HYDROCHLORIDE 37.5 MG/1
37.5 TABLET ORAL
Qty: 30 TABLET | Refills: 0 | Status: SHIPPED | OUTPATIENT
Start: 2023-09-14 | End: 2023-10-14

## 2023-09-14 SDOH — ECONOMIC STABILITY: INCOME INSECURITY: HOW HARD IS IT FOR YOU TO PAY FOR THE VERY BASICS LIKE FOOD, HOUSING, MEDICAL CARE, AND HEATING?: NOT HARD AT ALL

## 2023-09-14 SDOH — ECONOMIC STABILITY: HOUSING INSECURITY
IN THE LAST 12 MONTHS, WAS THERE A TIME WHEN YOU DID NOT HAVE A STEADY PLACE TO SLEEP OR SLEPT IN A SHELTER (INCLUDING NOW)?: NO

## 2023-09-14 SDOH — ECONOMIC STABILITY: FOOD INSECURITY: WITHIN THE PAST 12 MONTHS, THE FOOD YOU BOUGHT JUST DIDN'T LAST AND YOU DIDN'T HAVE MONEY TO GET MORE.: NEVER TRUE

## 2023-09-14 SDOH — ECONOMIC STABILITY: FOOD INSECURITY: WITHIN THE PAST 12 MONTHS, YOU WORRIED THAT YOUR FOOD WOULD RUN OUT BEFORE YOU GOT MONEY TO BUY MORE.: NEVER TRUE

## 2023-09-14 ASSESSMENT — ENCOUNTER SYMPTOMS
VOMITING: 0
SHORTNESS OF BREATH: 0
DIARRHEA: 0
NAUSEA: 0

## 2023-09-14 NOTE — PROGRESS NOTES
importance of keeping up with recommended health maintenance and to schedule as soon as possible if overdue, as this is important in assessing for undiagnosed pathology,especially cancer, as well as protecting against potentially harmful/life threatening disease. Patient and/or guardian verbalizes understanding and agrees with above counseling, assessment and plan. Allquestions answered.

## 2023-09-16 PROBLEM — R63.5 ABNORMAL WEIGHT GAIN: Status: ACTIVE | Noted: 2023-09-16

## 2023-09-16 PROBLEM — E78.2 MIXED HYPERLIPIDEMIA: Status: ACTIVE | Noted: 2023-09-16

## 2023-10-13 DIAGNOSIS — R63.5 ABNORMAL WEIGHT GAIN: ICD-10-CM

## 2023-10-13 DIAGNOSIS — Z00.00 WELLNESS EXAMINATION: ICD-10-CM

## 2023-10-13 DIAGNOSIS — E78.2 MIXED HYPERLIPIDEMIA: ICD-10-CM

## 2023-10-13 LAB
HCT VFR BLD CALC: 45.8 % (ref 34–48)
HEMOGLOBIN: 15.5 G/DL (ref 11.5–15.5)
MCH RBC QN AUTO: 31.3 PG (ref 26–35)
MCHC RBC AUTO-ENTMCNC: 33.8 G/DL (ref 32–34.5)
MCV RBC AUTO: 92.5 FL (ref 80–99.9)
PDW BLD-RTO: 11.9 % (ref 11.5–15)
PLATELET # BLD: 258 K/UL (ref 130–450)
PMV BLD AUTO: 9.6 FL (ref 7–12)
RBC # BLD: 4.95 M/UL (ref 3.5–5.5)
WBC # BLD: 4 K/UL (ref 4.5–11.5)

## 2023-10-18 ENCOUNTER — OFFICE VISIT (OUTPATIENT)
Dept: FAMILY MEDICINE CLINIC | Age: 58
End: 2023-10-18
Payer: COMMERCIAL

## 2023-10-18 VITALS
DIASTOLIC BLOOD PRESSURE: 88 MMHG | WEIGHT: 170 LBS | OXYGEN SATURATION: 97 % | SYSTOLIC BLOOD PRESSURE: 134 MMHG | HEART RATE: 80 BPM | RESPIRATION RATE: 20 BRPM | BODY MASS INDEX: 29.02 KG/M2 | HEIGHT: 64 IN

## 2023-10-18 DIAGNOSIS — R63.5 ABNORMAL WEIGHT GAIN: ICD-10-CM

## 2023-10-18 DIAGNOSIS — H66.002 NON-RECURRENT ACUTE SUPPURATIVE OTITIS MEDIA OF LEFT EAR WITHOUT SPONTANEOUS RUPTURE OF TYMPANIC MEMBRANE: Primary | ICD-10-CM

## 2023-10-18 PROCEDURE — G8484 FLU IMMUNIZE NO ADMIN: HCPCS | Performed by: FAMILY MEDICINE

## 2023-10-18 PROCEDURE — 1036F TOBACCO NON-USER: CPT | Performed by: FAMILY MEDICINE

## 2023-10-18 PROCEDURE — G8417 CALC BMI ABV UP PARAM F/U: HCPCS | Performed by: FAMILY MEDICINE

## 2023-10-18 PROCEDURE — 99214 OFFICE O/P EST MOD 30 MIN: CPT | Performed by: FAMILY MEDICINE

## 2023-10-18 PROCEDURE — G8427 DOCREV CUR MEDS BY ELIG CLIN: HCPCS | Performed by: FAMILY MEDICINE

## 2023-10-18 PROCEDURE — 3017F COLORECTAL CA SCREEN DOC REV: CPT | Performed by: FAMILY MEDICINE

## 2023-10-18 ASSESSMENT — ENCOUNTER SYMPTOMS
DIARRHEA: 0
SHORTNESS OF BREATH: 0
VOMITING: 0
NAUSEA: 0

## 2023-10-18 NOTE — PROGRESS NOTES
[address] [facility], [address]   [address] [facility]   Tanisha Vital MD     Patient: Ralph Hewitt  [facility] Birth: 1965  Visit Date: 10/18/23    Jimbo Almanzar is a 63y.o. year old female here today for   Chief Complaint   Patient presents with    Weight Management    Otalgia     Left ear and left gland in throat        HPI  Patient here for follow up for weight management. Patient is tolerating Adipex, and lost 12 pounds since last visit. Denies side effects such as insomnia, tremor and palpitations. Patient is/is not exercising. Patient improving diet. Review of Systems   HENT:  Positive for ear pain. Eyes:  Negative for visual disturbance. Respiratory:  Negative for shortness of breath. Cardiovascular:  Negative for chest pain, palpitations and leg swelling. Gastrointestinal:  Negative for diarrhea, nausea and vomiting. Genitourinary:  Negative for difficulty urinating, dysuria and frequency. Skin:  Negative for rash. Psychiatric/Behavioral:  Negative for dysphoric mood. Past medical, surgical, social and/or family historyreviewed, updated as needed, and are non-contributory (unless otherwise stated). Medications, allergies, and problem list also reviewed and updated as needed in patient's record. Current Outpatient Medications   Medication Sig Dispense Refill    phentermine (ADIPEX-P) 37.5 MG tablet Take 1 tablet by mouth every morning (before breakfast) for 30 days. Max Daily Amount: 37.5 mg 30 tablet 0    azithromycin (ZITHROMAX) 250 MG tablet Take 1 tablet by mouth daily 500 mg on day 1 then 250 mg po daily for 4 more days 6 tablet 0    Omeprazole (PRILOSEC PO) Take 20 mg by mouth daily       citalopram (CELEXA) 20 MG tablet Take 1 tablet by mouth daily       No current facility-administered medications for this visit.        Wt Readings from Last 3 Encounters:   10/18/23 77.1 kg (170 lb)   09/14/23 82.6 kg (182 lb)

## 2023-10-21 ENCOUNTER — PATIENT MESSAGE (OUTPATIENT)
Dept: FAMILY MEDICINE CLINIC | Age: 58
End: 2023-10-21

## 2023-10-21 RX ORDER — PHENTERMINE HYDROCHLORIDE 37.5 MG/1
37.5 TABLET ORAL
Qty: 30 TABLET | Refills: 0 | Status: SHIPPED | OUTPATIENT
Start: 2023-10-21 | End: 2023-11-20

## 2023-10-21 RX ORDER — AZITHROMYCIN 250 MG/1
250 TABLET, FILM COATED ORAL DAILY
Qty: 6 TABLET | Refills: 0 | Status: SHIPPED | OUTPATIENT
Start: 2023-10-21

## 2023-11-17 NOTE — TELEPHONE ENCOUNTER
From: Dr. Angelica Lundberg  To: Pecolia Patch  Sent: 10/21/2023 11:06 PM EDT  Subject: Adipex sent--sorry for the delay    Thuy Jones! I am sorry, I just realized the prescription for Adipex and the antibiotic did not go through on Wednesday. They were both just sent to the pharmacy.

## 2024-03-05 ENCOUNTER — TELEPHONE (OUTPATIENT)
Dept: FAMILY MEDICINE CLINIC | Age: 59
End: 2024-03-05

## 2024-08-22 ENCOUNTER — PATIENT MESSAGE (OUTPATIENT)
Dept: FAMILY MEDICINE CLINIC | Age: 59
End: 2024-08-22

## 2024-08-22 DIAGNOSIS — Z12.31 ENCOUNTER FOR SCREENING MAMMOGRAM FOR MALIGNANT NEOPLASM OF BREAST: Primary | ICD-10-CM

## 2024-11-01 ENCOUNTER — HOSPITAL ENCOUNTER (OUTPATIENT)
Dept: MAMMOGRAPHY | Age: 59
Discharge: HOME | End: 2024-11-03

## 2024-11-01 DIAGNOSIS — Z12.31 ENCOUNTER FOR SCREENING MAMMOGRAM FOR MALIGNANT NEOPLASM OF BREAST: ICD-10-CM

## 2024-12-19 ENCOUNTER — TRANSCRIBE ORDERS (OUTPATIENT)
Dept: ADMINISTRATIVE | Age: 59
End: 2024-12-19

## 2024-12-19 DIAGNOSIS — Z12.31 ENCOUNTER FOR SCREENING MAMMOGRAM FOR MALIGNANT NEOPLASM OF BREAST: Primary | ICD-10-CM

## 2024-12-20 ENCOUNTER — HOSPITAL ENCOUNTER (OUTPATIENT)
Dept: MAMMOGRAPHY | Age: 59
Discharge: HOME | End: 2024-12-22
Payer: COMMERCIAL

## 2024-12-20 VITALS — HEIGHT: 64 IN | WEIGHT: 170 LBS | BODY MASS INDEX: 29.02 KG/M2

## 2024-12-20 DIAGNOSIS — Z12.31 ENCOUNTER FOR SCREENING MAMMOGRAM FOR MALIGNANT NEOPLASM OF BREAST: ICD-10-CM

## 2024-12-20 PROCEDURE — 77063 BREAST TOMOSYNTHESIS BI: CPT

## 2025-01-23 ENCOUNTER — HOSPITAL ENCOUNTER (EMERGENCY)
Age: 60
Discharge: HOME | End: 2025-01-23
Payer: COMMERCIAL

## 2025-01-23 ENCOUNTER — APPOINTMENT (OUTPATIENT)
Dept: GENERAL RADIOLOGY | Age: 60
End: 2025-01-23
Payer: COMMERCIAL

## 2025-01-23 VITALS
TEMPERATURE: 98.6 F | WEIGHT: 170 LBS | HEART RATE: 76 BPM | BODY MASS INDEX: 29.18 KG/M2 | OXYGEN SATURATION: 98 % | SYSTOLIC BLOOD PRESSURE: 160 MMHG | RESPIRATION RATE: 18 BRPM | DIASTOLIC BLOOD PRESSURE: 98 MMHG

## 2025-01-23 DIAGNOSIS — T14.8XXA HEMATOMA: ICD-10-CM

## 2025-01-23 DIAGNOSIS — S79.912A HIP INJURY, LEFT, INITIAL ENCOUNTER: Primary | ICD-10-CM

## 2025-01-23 PROCEDURE — 73502 X-RAY EXAM HIP UNI 2-3 VIEWS: CPT

## 2025-01-23 PROCEDURE — 99211 OFF/OP EST MAY X REQ PHY/QHP: CPT

## 2025-01-23 ASSESSMENT — PAIN SCALES - GENERAL: PAINLEVEL_OUTOF10: 4

## 2025-01-23 ASSESSMENT — PAIN DESCRIPTION - DESCRIPTORS: DESCRIPTORS: ACHING

## 2025-01-23 ASSESSMENT — PAIN - FUNCTIONAL ASSESSMENT: PAIN_FUNCTIONAL_ASSESSMENT: 0-10

## 2025-01-23 ASSESSMENT — PAIN DESCRIPTION - ORIENTATION: ORIENTATION: LEFT

## 2025-01-23 ASSESSMENT — PAIN DESCRIPTION - LOCATION: LOCATION: HIP

## 2025-01-23 NOTE — ED PROVIDER NOTES
Independent [facility]ARG[facility]ER[facility] Visit.    HPI:  26,   Time: 3:38 PM NESSA Hopkins is a 59 y.o. female presenting to the ED for left hip pain.  Patient reports 5 days ago she had a mechanical fall after slipping on ice and landing on her left hip/buttock.  Reports that she has had left hip pain since the fall with large bruising over the left hip.  Has gradually gotten better and states yesterday the aching had stopped.  Woke up this morning and aching had return.  The bruise is still present.  No head injury, loss consciousness or blood thinner use.  Pain is not worsened by ambulation or range of motion.  She is not currently taking anything for the pain.  Concerned about the bruise and wanted x-rays to make sure it is not broken.  Denies any numbness or tingling.  No low back pain or saddle anesthesia.    ROS:   Pertinent positives and negatives are stated within HPI, all other systems reviewed and are negative.  --------------------------------------------- PAST HISTORY ---------------------------------------------  Past Medical History:  has a past medical history of Celiac disease and GERD (gastroesophageal reflux disease).    Past Surgical History:  has a past surgical history that includes  section; Endoscopy, colon, diagnostic; EXCISION LESION HAND / FINGER (Right, 2018); and Tubal ligation.    Social History:  reports that she quit smoking about 21 years ago. Her smoking use included cigarettes. She started smoking about 39 years ago. She has a 18 pack-year smoking history. She has never used smokeless tobacco. She reports current alcohol use of about 1.0 standard drink of alcohol per week. She reports that she does not use drugs.    Family History: family history includes Cancer in her maternal grandmother; Heart Attack (age of onset: 73) in her father; Heart Disease in her brother and paternal grandfather; High Blood Pressure in her mother; Lung Cancer in her maternal grandfather.     The

## 2025-07-07 LAB — SURGICAL PATHOLOGY REPORT: NORMAL

## (undated) DEVICE — NDL CNTR 40CT FM MAG: Brand: MEDLINE INDUSTRIES, INC.

## (undated) DEVICE — GLOVE ORANGE PI 8   MSG9080

## (undated) DEVICE — STANDARD HYPODERMIC NEEDLE,POLYPROPYLENE HUB: Brand: MONOJECT

## (undated) DEVICE — GAUZE,SPONGE,4"X4",16PLY,STRL,LF,10/TRAY: Brand: MEDLINE

## (undated) DEVICE — CONTROL SYRINGE LUER-LOCK TIP: Brand: MONOJECT

## (undated) DEVICE — PACK EXT II SIRUS

## (undated) DEVICE — MARKER,SKIN,WI/RULER AND LABELS: Brand: MEDLINE

## (undated) DEVICE — TUBING, SUCTION, 1/4" X 10', STRAIGHT: Brand: MEDLINE

## (undated) DEVICE — TOWEL,OR,DSP,ST,BLUE,STD,6/PK,12PK/CS: Brand: MEDLINE

## (undated) DEVICE — PATIENT RETURN ELECTRODE, SINGLE-USE, CONTACT QUALITY MONITORING, ADULT, WITH 9FT CORD, FOR PATIENTS WEIGING OVER 33LBS. (15KG): Brand: MEGADYNE

## (undated) DEVICE — PENCIL ES L3M BTTN SWCH HOLSTER W/ BLDE ELECTRD EDGE

## (undated) DEVICE — GOWN,SIRUS,NONRNF,SETINSLV,XL,20/CS: Brand: MEDLINE

## (undated) DEVICE — READY WET SKIN SCRUB TRAY-LF: Brand: MEDLINE INDUSTRIES, INC.

## (undated) DEVICE — INTENDED FOR TISSUE SEPARATION, AND OTHER PROCEDURES THAT REQUIRE A SHARP SURGICAL BLADE TO PUNCTURE OR CUT.: Brand: BARD-PARKER ® STAINLESS STEEL BLADES

## (undated) DEVICE — GAUZE,SPONGE,4"X4",16PLY,XRAY,STRL,LF: Brand: MEDLINE

## (undated) DEVICE — BANDAGE,GAUZE,CONFORMING,4"X75",STRL,LF: Brand: MEDLINE

## (undated) DEVICE — DOUBLE BASIN SET: Brand: MEDLINE INDUSTRIES, INC.

## (undated) DEVICE — TRAY PROCED PLASTIC CUSTOM MINOR

## (undated) DEVICE — SPONGE LAP W18XL18IN WHT COT 4 PLY FLD STRUNG RADPQ DISP ST

## (undated) DEVICE — BLADE OPHTH GRN ROUNDED TIP 1 SIDE SHRP GRINDLESS MINI-BLDE

## (undated) DEVICE — BANDAGE,ELASTIC,ESMARK,STERILE,4"X9',LF: Brand: MEDLINE